# Patient Record
Sex: MALE | Race: WHITE | URBAN - METROPOLITAN AREA
[De-identification: names, ages, dates, MRNs, and addresses within clinical notes are randomized per-mention and may not be internally consistent; named-entity substitution may affect disease eponyms.]

---

## 2020-08-09 ENCOUNTER — HISTORICAL (OUTPATIENT)
Dept: MEDSURG UNIT | Facility: HOSPITAL | Age: 40
End: 2020-08-09

## 2020-08-09 LAB
HCO3 UR-SCNC: 27.1 MMOL/L (ref 22–26)
O2 HGB ARTERIAL: 95.9 % (ref 94–97)
PCO2 BLDA: 55 MMHG (ref 35–45)
PH SMN: 7.3 [PH] (ref 7.35–7.45)
PO2 BLDA: 133 MMHG (ref 80–100)
POC ALLENS TEST: ABNORMAL
POC BE: -0.7 (ref -2–3)
POC CAO2: 23.2 ML/DL (ref 17.6–24.3)
POC CO HGB: 2.2 %
POC CO2: 28.8 MMOL/L (ref 22–27)
POC IONIZED CALCIUM: 1.2 MMOL/L (ref 1.12–1.23)
POC MET HGB: 1.2 % (ref 0.4–1.5)
POC SAMPLESOURCE: ABNORMAL
POC SATURATED O2: 98.7 % (ref 96–97)
POC SITE: ABNORMAL
POC THB: 17.1 GM/DL (ref 13.5–18)
POC TREATMENT: ABNORMAL
POTASSIUM BLD-SCNC: 3.6 MMOL/L (ref 3.6–5)
SARS-COV-2 RNA RESP QL NAA+PROBE: NOT DETECTED
SETTING 1 ABG: ABNORMAL
SETTING 2 ABG: ABNORMAL
SETTING 3 ABG: ABNORMAL
SETTING 4 ABG: ABNORMAL
SODIUM BLD-SCNC: 141 MMOL/L (ref 137–145)

## 2022-04-30 NOTE — ED PROVIDER NOTES
Patient:   Muriel Alex             MRN: [id number]            FIN: [id number]               Age:   39 years     Sex:  Male     :  1980   Associated Diagnoses:   Priapism; Hypertension   Author:   Lana HE, Carrington CHRISTIE      Basic Information   History source: EMS.   Arrival mode: Ambulance.   History limitation: None.   Additional information: Patient's physician(s): Marya HE , Ishmael Marti.      History of Present Illness   The patient presents with priapism,     38 y/o short obese white male presents to ED as a transfer from [place] with c/o priapism that began after pt had intercourse w/his wife at 1500 yesterday 2020   Pt reports that he did not have any penis  pain during intercourse not particularly rough intercourse.  No sudden painful event..  In [place], pt received 20 separate injections of phenylephrine [id number] at a time w/x10 injections in each corpus cavernosum. and  has been able to urinate through this.  He had a completely negative workup.  He was treated extensive treatment at the outside hospital without any success was transferred here for urological evaluation.  The doctor reported to me that he gave after he aspirated 15 mL from each corpus cavernosum.  He began injections.  100 µg alternating between sides.  For toltal 20 times.  For a 2 mg dose of phenylephrine.  The onset was 0300.  The course/duration of symptoms is constant.  The character of symptoms is swelling.  The degree at onset was moderate.  The degree at present is moderate.  The exacerbating factor is none.  The relieving factor is none.  Risk factors consist of HTN .  Prior episodes: none.  Therapy today: emergency medical services.  Associated symptoms: none.  Additional history: none.        Review of Systems   Constitutional symptoms:  Negative except as documented in HPI.   Skin symptoms:  Negative except as documented in HPI.   Eye symptoms:  Negative except as documented in HPI.   ENMT symptoms:   Negative except as documented in HPI.   Respiratory symptoms:  Negative except as documented in HPI.   Cardiovascular symptoms:  Negative except as documented in HPI.   Gastrointestinal symptoms:  Negative except as documented in HPI.   Genitourinary symptoms:  Priapism .   Musculoskeletal symptoms:  Negative except as documented in HPI.   Neurologic symptoms:  Negative except as documented in HPI.   Psychiatric symptoms:  Negative except as documented in HPI.   Endocrine symptoms:  Negative except as documented in HPI.   Hematologic/Lymphatic symptoms:  Negative except as documented in HPI.   Allergy/immunologic symptoms:  Negative except as documented in HPI.             Additional review of systems information: All other systems reviewed and otherwise negative.      Health Status   Allergies: No known allergies.   Medications:  (Selected)   Documented Medications  Documented  aspirin 81 mg oral Delayed Release (EC) tablet: 81 mg = 1 tab(s), Oral, Daily, # 30 tab(s), 0 Refill(s)  carvedilol 12.5 mg oral tablet: 12.5 mg = 1 tab(s), Oral, BID  hydrochlorothiazide 12.5 mg oral tablet: 12.5 mg = 1 tab(s), Oral, Daily  losartan 100 mg oral tablet: 100 mg = 1 tab(s), Oral, Daily  terazosin 5 mg oral capsule: 5 mg = 1 cap(s), Oral, qPM.   Immunizations:   Pt reports Tetanus, Influenza, or Pneumonia vaccine is not UTD.      Past Medical/ Family/ Social History   Medical history: Hypertension .   Surgical history: Pt reports he had a cholecystectomy in March 2020.   Family history: Pt reports both parents are alive and have a hx of HTN. .   Social history: Alcohol use: Regularly, Tobacco use: Denies, Drug use: Denies, Occupation: Employed (self), Family/social situation: , Lives w/wife .      Physical Examination               Vital Signs   Vital Signs   8/9/2020 17:32 CDT       Temperature Temporal Artery               36.7 DegC                             Peripheral Pulse Rate     80 bpm                              Respiratory Rate          16 br/min                             SpO2                      98 %                             Oxygen Therapy            Room air                             Systolic Blood Pressure   150 mmHg  HI                             Diastolic Blood Pressure  100 mmHg  HI  .   Measurements   8/9/2020 17:32 CDT       Weight Dosing             127 kg                             Weight Measured and Calculated in Lbs     279.98 lb                             Weight Estimated          127 kg                             Height/Length Dosing      175 cm                             Height/Length Estimated   175 cm                             Body Mass Index Estimated 41.47 kg/m2  .   Basic Oxygen Information   8/9/2020 17:32 CDT       SpO2                      98 %                             Oxygen Therapy            Room air  .   General:  Alert, no acute distress, Short white obese male .    Skin:  Warm, dry, normal for ethnicity.    Head:  Normocephalic, atraumatic.    Neck:  Supple, trachea midline, no tenderness, no JVD, no carotid bruit.    Eye:  Pupils are equal, round and reactive to light, extraocular movements are intact, normal conjunctiva, vision unchanged.    Ears, nose, mouth and throat:  Tympanic membranes clear, oral mucosa moist, no pharyngeal erythema or exudate.    Cardiovascular:  Regular rate and rhythm, No murmur, Normal peripheral perfusion, No edema.    Respiratory:  Lungs are clear to auscultation, respirations are non-labored, breath sounds are equal, Symmetrical chest wall expansion.    Chest wall:  No tenderness, No deformity.    Back:  Nontender, Normal range of motion, Normal alignment, no step-offs.    Musculoskeletal:  Normal ROM, normal strength, no tenderness, no swelling, no deformity.    Gastrointestinal:  Soft, Nontender, Non distended, Normal bowel sounds, No organomegaly.    Genitourinary:  Penis: Rigid , Bruising on both sides of penis , I retracted the foreskin  of the penis and could visualize the glands that was  pink and well perfused.  Very short penis bruising on both sides are present from the injections.  Shaft was straight.  There was no obvious curvature.  Testicles were descended the whole penis appeared to be retracted and the   Pubic mons area, The penis seemed to be retracted into the mons pubis .   Neurological:  Alert and oriented to person, place, time, and situation, CN II-XII intact, normal sensory observed, normal motor observed, normal speech observed, normal coordination observed.    Lymphatics:  No lymphadenopathy.   Psychiatric:  Cooperative, appropriate mood & affect, normal judgment, non-suicidal.       Medical Decision Making   Differential Diagnosis:  Priapism.   Rationale:   Documents reviewed:  Emergency department nurses' notes.   Orders  Launch Order Profile (Selected)   Inpatient Orders  Ordered  Patient Isolation: 08/09/20 18:16:03 CDT, Contact Precautions, Constant Indicator  Patient Isolation: 08/09/20 18:16:03 CDT, Droplet Precautions, Constant Indicator  Completed  SARS-CoV-2 PCR - Inpatient only [facility]: Stat collect, Nasopharyngeal Swab, 08/09/20 18:15:00 CDT, Stop date 08/09/20 18:16:00 CDT, Nurse collect  fentaNYL: 100 mcg, 2 mL, Injection, N/A, Once, Stop date 08/09/20 18:43:38 CDT, Physician Stop, 08/09/20 18:43:38 CDT  lidocaine 2% PF: 4 mL, Injection, N/A, Once, Stop date 08/09/20 18:41:26 CDT, Physician Stop, 08/09/20 18:41:26 CDT  midazolam: 2 mg, 2 mL, Injection, N/A, Once, Stop date 08/09/20 18:43:42 CDT, Physician Stop, 08/09/20 18:43:42 CDT  propofol: 200 mg, 20 mL, Injection, N/A, Once, Stop date 08/09/20 18:41:31 CDT, Physician Stop, 08/09/20 18:41:31 CDT.   Electrocardiogram:  Time 8/9/2020 19:17:00, rate 78, No ST-T changes, no ectopy, EP Interp, The Rhythm is atrial fibrillation.  , QT interval Prolonged QT @ 56ms.    Results review:  Lab results : Lab View   8/9/2020 17:32 CDT       SARS-CoV-2 PCR            Not Detected   .   Notes:  I spoke to Dr. Kiel Cannon @ 1830 pt is being prepped for operating room.  I did a Penile Block @ 1815.  6CCs 1% and 2% Lidocaine .      Reexamination/ Reevaluation   Pain status: Patient did get relief from the most of the pain after the penile block.  I talked to the urologist urologist asked us to call at surgery and he was coming to see the patient patient was taken upstairs to the holding area and the urologist was to examine him up there.      Impression and Plan   Diagnosis   Priapism (VAC85-HN N48.30)   Hypertension (EAL51-OS I10)      Calls-Consults   -  Davis HE, Kiel JONES   Plan   Disposition: Admit to Surgery.    Counseled: Patient, Regarding diagnosis, Regarding diagnostic results, Regarding treatment plan, Patient indicated understanding of instructions.    Notes:   I, Rod Villegas, acted solely as a scribe for and in the presence of Dr. Schwartz who performed the service., I, Carrington Schwartz MD, a physician licensed to practice in this state, have  performed the physical evaluation,  history gathering,  and medical decision making that is reflected in this record..   FOSTER Schwartz MD.

## 2022-04-30 NOTE — OP NOTE
Patient:   Muriel Alex             MRN: [id number]            FIN: [id number]               Age:   39 years     Sex:  Male     :  1980   Associated Diagnoses:   None   Author:   Kiel Cannon MD      SURGEON:  Kiel Cannon MD    ANESTHESIA:  General.    PREOPERATIVE DIAGNOSIS(ES):  Ischemic Priapism    POSTOPERATIVE DIAGNOSIS(ES):  same    PROCEDURE(S)/OPERATION(S) PERFORMED:  T-shunt     FLUIDS:  500 mL crystalloid.    ESTIMATED BLOOD LOSS:  100 mL.    SPECIMENS:  none    FINDINGS:  Ischemic priapism  dark coagulated blood irrigated out of the corporeal bodies    DRAINS:  None.    COMPLICATIONS:  None.    SUMMARY::  After informed consent was obtained, the patient was brought to the operating room and placed  in the supine position. After adequate general sedation, the genitalia were prepped and draped in  sterile fashion. A time out was performed with the patient identified using 2 identifiers and the  procedure site was verified. A penile block was achieved in the standard fashion using 10 mL of  0.5 % marcaine and 10 mL of 1% lidocaine.   A munson catheter was used to identify the uretrhal.  A 18guage needle was used to aspirate corporeal blood and this was sent for a blood gas. this confirmed ischemic priapism.  200micrograms of phenylephrine was then injected thorught the 18gauge needle.    At this point a Tshunt was performed by using a 10blade and stabbing the left corporeal body through the Glans and the blade was removed after a 90degree lateral turn.    Dark old blood was then irrigate out of the penis. The corporea was irrigated with NS. The penis detumesed quickly. another 300micrograms for phenlyephrine was injected into the corporea.  at this point the penis was no longer rigid and adequately detumesed.    The Tshunt incision was closed with 4-0 vicryl. antibiotic ontiment was used as a dressing.    The patient tolerated the procedure well, and was transported to the recovery room  in stable condition. His  family was informed of the outcome following the procedure.

## 2022-05-03 NOTE — HISTORICAL [facility] CERNER
This is a historical note converted from [employer]. Formatting and pictures may have been removed.  Please reference [employer] for original formatting and attached multimedia. Chief Complaint  [facility]. priaprism. see transfer in report sheet  Reason for Consultation  ischemic priapism  History of Present Illness  Pt 38yo Male who about 24hours ago noticed that his erection did not go away after intercourse.  there was no trauma during intercourse.  the erection has been hard and painful.  ?  He presented to an outlining facitily. He underwent injection phyenlyephrine and irrigation without success  He was transfer here for further care  ?  He current has a painful hard erection  ?  No illict drugs.  no h/o sickle cell  no other medical h/o  Review of Systems  Constitutional:?no fever, fatigue, weakness  Eye:?no vision loss, eye redness, drainage, or pain  ENMT:?no sore throat, ear pain, sinus pain/congestion, nasal congestion/drainage  Respiratory:?no cough, no wheezing, no shortness of breath  Cardiovascular:?no chest pain, no palpitations, no edema  Gastrointestinal:?no nausea, vomiting, or diarrhea. No abdominal pain  Genitourinary:?no dysuria, no urinary frequency or urgency, no hematuria  Hema/Lymph:?no abnormal bruising or bleeding  Endocrine:?no heat or cold intolerance, no excessive thirst or excessive urination  Musculoskeletal:?no muscle or joint pain, no joint swelling  Integumentary:?no skin rash or abnormal lesion  Neurologic: no headache, no dizziness, no weakness or numbness  ?  Physical Exam  Vitals & Measurements  T:?36.7? ?C (Temporal Artery)? HR:?69(Peripheral)? HR:?68(Monitored)? RR:?24? BP:?153/84? SpO2:?96%? WT:?127?kg?  General:?well-developed well-nourished in no acute distress  Eye: PERRLA, EOMI, clear conjunctiva, eyelids normal  HENT:?TMs/ear canals clear, oropharynx without erythema/exudate, oropharynx and nasal mucosal surfaces moist, no maxillary/frontal sinus  tenderness to palpation  Neck: full range of motion, no thyromegaly or lymphadenopathy  Respiratory:?clear to auscultation bilaterally  Cardiovascular:?regular rate and rhythm without murmurs, gallops or rubs  Gastrointestinal:?soft, non-tender, non-distended with normal bowel sounds, without masses to palpation  Genitourinary: hard firm erection typical of ischemic priapism.  Musculoskeletal:?full range of motion of all extremities/spine without limitation or discomfort  Integumentary: no rashes or skin lesions present  Neurologic: cranial nerves intact, no signs of peripheral neurological deficit, motor/sensory function intact  ?  Assessment/Plan  Priapism?[id number]  ?discussed iwht patient that given the amount of time that has transpired and the fact that he has failed medical treatment.  I recommend going the OR for Tshunt with possible tunneling  ?  we discussed risk and complications.  pt understands that permanent ED is a possiblity given the length of time that has transpired.  ?  Transfer?[id number]   Problem List/Past Medical History  Ongoing  No qualifying data  Historical  No qualifying data  Medications  Inpatient  No active inpatient medications  Home  aspirin 81 mg oral Delayed Release (EC) tablet, 81 mg= 1 tab(s), Oral, Daily  carvedilol 12.5 mg oral tablet, 12.5 mg= 1 tab(s), Oral, BID  hydrochlorothiazide 12.5 mg oral tablet, 12.5 mg= 1 tab(s), Oral, Daily  losartan 100 mg oral tablet, 100 mg= 1 tab(s), Oral, Daily  terazosin 5 mg oral capsule, 5 mg= 1 cap(s), Oral, qPM  Allergies  No Known Medication Allergies  Social History  Abuse/Neglect  No, 08/09/2020  Alcohol  Current, Beer, Liquor, Daily, 08/09/2020  Substance Use  Never, 08/09/2020  Tobacco  Never (less than 100 in lifetime), N/A, 08/09/2020